# Patient Record
Sex: MALE | Race: WHITE | NOT HISPANIC OR LATINO | Employment: OTHER | URBAN - NONMETROPOLITAN AREA
[De-identification: names, ages, dates, MRNs, and addresses within clinical notes are randomized per-mention and may not be internally consistent; named-entity substitution may affect disease eponyms.]

---

## 2020-01-14 PROCEDURE — 87070 CULTURE OTHR SPECIMN AEROBIC: CPT | Performed by: FAMILY MEDICINE

## 2020-01-14 PROCEDURE — 87205 SMEAR GRAM STAIN: CPT | Performed by: FAMILY MEDICINE

## 2020-01-28 ENCOUNTER — OFFICE VISIT (OUTPATIENT)
Dept: INTERNAL MEDICINE | Facility: CLINIC | Age: 55
End: 2020-01-28

## 2020-01-28 ENCOUNTER — LAB (OUTPATIENT)
Dept: LAB | Facility: HOSPITAL | Age: 55
End: 2020-01-28

## 2020-01-28 VITALS
HEIGHT: 69 IN | WEIGHT: 189 LBS | SYSTOLIC BLOOD PRESSURE: 118 MMHG | TEMPERATURE: 98.2 F | OXYGEN SATURATION: 98 % | DIASTOLIC BLOOD PRESSURE: 72 MMHG | RESPIRATION RATE: 18 BRPM | HEART RATE: 80 BPM | BODY MASS INDEX: 27.99 KG/M2

## 2020-01-28 DIAGNOSIS — Z00.00 HEALTHCARE MAINTENANCE: ICD-10-CM

## 2020-01-28 DIAGNOSIS — Z11.59 ENCOUNTER FOR HEPATITIS C SCREENING TEST FOR LOW RISK PATIENT: ICD-10-CM

## 2020-01-28 DIAGNOSIS — Z12.5 PROSTATE CANCER SCREENING: ICD-10-CM

## 2020-01-28 DIAGNOSIS — I10 ESSENTIAL HYPERTENSION: Primary | ICD-10-CM

## 2020-01-28 DIAGNOSIS — Z12.11 COLON CANCER SCREENING: ICD-10-CM

## 2020-01-28 LAB
ALBUMIN SERPL-MCNC: 4 G/DL (ref 3.5–5.2)
ALBUMIN/GLOB SERPL: 1.5 G/DL
ALP SERPL-CCNC: 130 U/L (ref 39–117)
ALT SERPL W P-5'-P-CCNC: 41 U/L (ref 1–41)
ANION GAP SERPL CALCULATED.3IONS-SCNC: 13.6 MMOL/L (ref 5–15)
AST SERPL-CCNC: 16 U/L (ref 1–40)
BASOPHILS # BLD AUTO: 0.03 10*3/MM3 (ref 0–0.2)
BASOPHILS NFR BLD AUTO: 0.4 % (ref 0–1.5)
BILIRUB SERPL-MCNC: 0.3 MG/DL (ref 0.2–1.2)
BUN BLD-MCNC: 15 MG/DL (ref 6–20)
BUN/CREAT SERPL: 15.3 (ref 7–25)
CALCIUM SPEC-SCNC: 9 MG/DL (ref 8.6–10.5)
CHLORIDE SERPL-SCNC: 101 MMOL/L (ref 98–107)
CHOLEST SERPL-MCNC: 189 MG/DL (ref 0–200)
CO2 SERPL-SCNC: 26.4 MMOL/L (ref 22–29)
CREAT BLD-MCNC: 0.98 MG/DL (ref 0.76–1.27)
DEPRECATED RDW RBC AUTO: 39.8 FL (ref 37–54)
EOSINOPHIL # BLD AUTO: 0.24 10*3/MM3 (ref 0–0.4)
EOSINOPHIL NFR BLD AUTO: 3.2 % (ref 0.3–6.2)
ERYTHROCYTE [DISTWIDTH] IN BLOOD BY AUTOMATED COUNT: 12.1 % (ref 12.3–15.4)
GFR SERPL CREATININE-BSD FRML MDRD: 80 ML/MIN/1.73
GLOBULIN UR ELPH-MCNC: 2.6 GM/DL
GLUCOSE BLD-MCNC: 89 MG/DL (ref 65–99)
HCT VFR BLD AUTO: 41.9 % (ref 37.5–51)
HCV AB SER DONR QL: NORMAL
HDLC SERPL-MCNC: 34 MG/DL (ref 40–60)
HGB BLD-MCNC: 14.7 G/DL (ref 13–17.7)
IMM GRANULOCYTES # BLD AUTO: 0.02 10*3/MM3 (ref 0–0.05)
IMM GRANULOCYTES NFR BLD AUTO: 0.3 % (ref 0–0.5)
LDLC SERPL CALC-MCNC: 92 MG/DL (ref 0–100)
LDLC/HDLC SERPL: 2.72 {RATIO}
LYMPHOCYTES # BLD AUTO: 2.13 10*3/MM3 (ref 0.7–3.1)
LYMPHOCYTES NFR BLD AUTO: 28.4 % (ref 19.6–45.3)
MCH RBC QN AUTO: 32 PG (ref 26.6–33)
MCHC RBC AUTO-ENTMCNC: 35.1 G/DL (ref 31.5–35.7)
MCV RBC AUTO: 91.3 FL (ref 79–97)
MONOCYTES # BLD AUTO: 0.56 10*3/MM3 (ref 0.1–0.9)
MONOCYTES NFR BLD AUTO: 7.5 % (ref 5–12)
NEUTROPHILS # BLD AUTO: 4.52 10*3/MM3 (ref 1.7–7)
NEUTROPHILS NFR BLD AUTO: 60.2 % (ref 42.7–76)
NRBC BLD AUTO-RTO: 0 /100 WBC (ref 0–0.2)
PLATELET # BLD AUTO: 307 10*3/MM3 (ref 140–450)
PMV BLD AUTO: 9.3 FL (ref 6–12)
POTASSIUM BLD-SCNC: 4.3 MMOL/L (ref 3.5–5.2)
PROT SERPL-MCNC: 6.6 G/DL (ref 6–8.5)
PSA SERPL-MCNC: 0.46 NG/ML (ref 0–4)
RBC # BLD AUTO: 4.59 10*6/MM3 (ref 4.14–5.8)
SODIUM BLD-SCNC: 141 MMOL/L (ref 136–145)
TRIGL SERPL-MCNC: 313 MG/DL (ref 0–150)
VLDLC SERPL-MCNC: 62.6 MG/DL (ref 5–40)
WBC NRBC COR # BLD: 7.5 10*3/MM3 (ref 3.4–10.8)

## 2020-01-28 PROCEDURE — 80053 COMPREHEN METABOLIC PANEL: CPT | Performed by: INTERNAL MEDICINE

## 2020-01-28 PROCEDURE — 36415 COLL VENOUS BLD VENIPUNCTURE: CPT

## 2020-01-28 PROCEDURE — 85025 COMPLETE CBC W/AUTO DIFF WBC: CPT | Performed by: INTERNAL MEDICINE

## 2020-01-28 PROCEDURE — 80061 LIPID PANEL: CPT | Performed by: INTERNAL MEDICINE

## 2020-01-28 PROCEDURE — 86803 HEPATITIS C AB TEST: CPT | Performed by: INTERNAL MEDICINE

## 2020-01-28 PROCEDURE — G0103 PSA SCREENING: HCPCS | Performed by: INTERNAL MEDICINE

## 2020-01-28 PROCEDURE — 99386 PREV VISIT NEW AGE 40-64: CPT | Performed by: INTERNAL MEDICINE

## 2020-01-28 NOTE — PROGRESS NOTES
Chief Complaint   Patient presents with   • Establish Care   • Hypertension         History:  Rohan Malagon is a 54 y.o. male who presents today for evaluation of the above problems.      He is a 54-year-old gentleman who recently went to urgent care January 14, 2020 for a knot in his right armpit for 2 weeks.  He was diagnosed with a right axillary abscess and was given 10-day course of Bactrim.  He was also hypertensive at 149/90.  He was referred here for his primary care.    Knot present for years, never gotten infected before.  After the antibiotics it is back to its baseline    BP better today.  Has not checked his blood pressure at home.    Has been many years since he has seen a primary care doctor, does not believe he has ever received 1.    Never had colonoscopy.  Family history of lung cancer and he continues to smoke.  He is not ready to quit smoking.    He works as a  and has noticed some decreased muscle mass and some weakness compared to his usual.  HPI    ROS:  Review of Systems   Constitutional: Negative for activity change, appetite change, fatigue, fever and unexpected weight change.   HENT: Negative for nosebleeds, sore throat and trouble swallowing.    Eyes: Negative for pain and visual disturbance.   Respiratory: Positive for wheezing. Negative for cough, chest tightness and shortness of breath.    Cardiovascular: Negative for chest pain, palpitations and leg swelling.   Gastrointestinal: Negative for abdominal pain, constipation, diarrhea, nausea and vomiting.   Endocrine: Negative for cold intolerance and heat intolerance.   Genitourinary: Positive for frequency. Negative for hematuria.   Musculoskeletal: Positive for myalgias. Negative for back pain, neck pain and neck stiffness.   Skin: Negative for rash and wound.   Neurological: Negative for dizziness, syncope, weakness, light-headedness and headaches.   Hematological: Negative for adenopathy. Bruises/bleeds easily.  "  Psychiatric/Behavioral: Negative for agitation, behavioral problems and confusion.       No Known Allergies  History reviewed. No pertinent past medical history.  History reviewed. No pertinent surgical history.  Family History   Problem Relation Age of Onset   • Arthritis Mother    • Cancer Father      Rohan  reports that he has been smoking cigars. He has never used smokeless tobacco. He reports that he drinks alcohol. Drug use questions deferred to the physician.    I have reviewed and updated the above documentation (if necessary) including but not limited to chief complaint, ROS, PFSH, allergies and medications      No current outpatient medications on file.    PHQ-9 Depression Screening  Little interest or pleasure in doing things? 0   Feeling down, depressed, or hopeless? 0   Trouble falling or staying asleep, or sleeping too much?     Feeling tired or having little energy?     Poor appetite or overeating?     Feeling bad about yourself - or that you are a failure or have let yourself or your family down?     Trouble concentrating on things, such as reading the newspaper or watching television?     Moving or speaking so slowly that other people could have noticed? Or the opposite - being so fidgety or restless that you have been moving around a lot more than usual?     Thoughts that you would be better off dead, or of hurting yourself in some way?     PHQ-9 Total Score 0   If you checked off any problems, how difficult have these problems made it for you to do your work, take care of things at home, or get along with other people?       PHQ-9 Total Score: 0    OBJECTIVE:  Visit Vitals  /72 (BP Location: Left arm, Patient Position: Sitting)   Pulse 80   Temp 98.2 °F (36.8 °C) (Oral)   Resp 18   Ht 175.3 cm (69\")   Wt 85.7 kg (189 lb)   SpO2 98%   BMI 27.91 kg/m²      Physical Exam   Constitutional: He is oriented to person, place, and time. He appears well-developed and well-nourished. No distress. "   HENT:   Head: Normocephalic and atraumatic.   Mouth/Throat: Oropharynx is clear and moist. No oropharyngeal exudate.   Clear TMs   Eyes: Pupils are equal, round, and reactive to light. Conjunctivae and EOM are normal. No scleral icterus.   Neck: Normal range of motion. Neck supple. No JVD present. No thyromegaly present.   Cardiovascular: Normal rate, regular rhythm and normal heart sounds. Exam reveals no friction rub.   No murmur heard.  Pulmonary/Chest: Effort normal and breath sounds normal. No stridor. He has no wheezes. He has no rales.   Abdominal: Soft. Bowel sounds are normal. He exhibits no distension. There is no tenderness. There is no rebound and no guarding.   Musculoskeletal: He exhibits no edema or tenderness.   Lymphadenopathy:     He has no cervical adenopathy.   Neurological: He is alert and oriented to person, place, and time. No cranial nerve deficit.   3+ bilateral patellar reflexes   Skin: Skin is warm and dry.   Small 1 x 2 cm right axillary abscess, able to express some purulent material.  No lymphadenopathy   Psychiatric: He has a normal mood and affect. His behavior is normal.       MDM    Assessment/Plan    Rohan was seen today for establish care and hypertension.    Diagnoses and all orders for this visit:    Essential hypertension    Healthcare maintenance  -     Comprehensive Metabolic Panel; Future  -     CBC & Differential; Future  -     Lipid Panel; Future    Encounter for hepatitis C screening test for low risk patient  -     Hepatitis C antibody; Future    Colon cancer screening  -     Ambulatory Referral to Gastroenterology    Prostate cancer screening  -     PSA SCREENING; Future    His blood pressure is actually well controlled today.  Given him some information on DASH diet and preventing hypertension.  Also discussed to check blood pressure at home and notify me if it is 140-150 systolic.  Would like to check labs as above.  Discussed prostate cancer screening and he would  like a PSA today.  I referred him to gastroenterology for colon cancer screening as well.    His right axillary abscess is improving.  Suspect it started as folliculitis but it is improving.  Also discussed if it becomes more bothersome he could see a dermatologist or general surgeon for incision and drainage.  At this point it is not bothersome to him.    He continues to smoke and is not ready to quit.  Next year low dose CT chest for lung cancer screen    Follow-up 1 year for annual physical or sooner if needed    Patient's Body mass index is 27.91 kg/m². BMI is above normal parameters. Recommendations include: educational material.      Education materials and an After Visit Summary were printed and given to the patient at discharge.  Return in about 1 year (around 1/28/2021) for Annual physical.         DMahendra Walker MD   8:20 AM  1/28/2020

## 2020-01-30 ENCOUNTER — TELEPHONE (OUTPATIENT)
Dept: INTERNAL MEDICINE | Facility: CLINIC | Age: 55
End: 2020-01-30

## 2020-01-30 NOTE — TELEPHONE ENCOUNTER
LET PT KNOW THAT HIS LABS WERE NORMAL EXCEPT HIS TRIGLYCERIDES WERE ELEVATED. NEEDED TO WATCH WHAT HE EATS AND EXERCISE.

## 2020-05-12 ENCOUNTER — TELEPHONE (OUTPATIENT)
Dept: GASTROENTEROLOGY | Facility: CLINIC | Age: 55
End: 2020-05-12

## 2020-05-12 NOTE — TELEPHONE ENCOUNTER
Called to reschedule colonoscopy screening consultation.  Patient said he wasn't sure about that right now and would have to call us back.

## 2021-01-29 ENCOUNTER — OFFICE VISIT (OUTPATIENT)
Dept: INTERNAL MEDICINE | Facility: CLINIC | Age: 56
End: 2021-01-29

## 2021-01-29 ENCOUNTER — LAB (OUTPATIENT)
Dept: LAB | Facility: HOSPITAL | Age: 56
End: 2021-01-29

## 2021-01-29 VITALS
HEIGHT: 69 IN | HEART RATE: 79 BPM | OXYGEN SATURATION: 98 % | WEIGHT: 181.2 LBS | DIASTOLIC BLOOD PRESSURE: 84 MMHG | BODY MASS INDEX: 26.84 KG/M2 | TEMPERATURE: 97.6 F | SYSTOLIC BLOOD PRESSURE: 138 MMHG

## 2021-01-29 DIAGNOSIS — Z12.5 PROSTATE CANCER SCREENING: ICD-10-CM

## 2021-01-29 DIAGNOSIS — Z00.00 ENCOUNTER FOR PREVENTIVE CARE: Primary | ICD-10-CM

## 2021-01-29 DIAGNOSIS — Z13.31 DEPRESSION SCREEN: ICD-10-CM

## 2021-01-29 DIAGNOSIS — Z12.11 COLON CANCER SCREENING: ICD-10-CM

## 2021-01-29 DIAGNOSIS — Z00.00 ENCOUNTER FOR PREVENTIVE CARE: ICD-10-CM

## 2021-01-29 DIAGNOSIS — Z13.6 HYPERTENSION SCREEN: ICD-10-CM

## 2021-01-29 LAB
ALBUMIN SERPL-MCNC: 4.5 G/DL (ref 3.5–5.2)
ALBUMIN/GLOB SERPL: 2 G/DL
ALP SERPL-CCNC: 101 U/L (ref 39–117)
ALT SERPL W P-5'-P-CCNC: 17 U/L (ref 1–41)
ANION GAP SERPL CALCULATED.3IONS-SCNC: 7.4 MMOL/L (ref 5–15)
AST SERPL-CCNC: 18 U/L (ref 1–40)
BILIRUB SERPL-MCNC: 0.6 MG/DL (ref 0–1.2)
BUN SERPL-MCNC: 17 MG/DL (ref 6–20)
BUN/CREAT SERPL: 21.5 (ref 7–25)
CALCIUM SPEC-SCNC: 9.6 MG/DL (ref 8.6–10.5)
CHLORIDE SERPL-SCNC: 104 MMOL/L (ref 98–107)
CHOLEST SERPL-MCNC: 184 MG/DL (ref 0–200)
CO2 SERPL-SCNC: 29.6 MMOL/L (ref 22–29)
CREAT SERPL-MCNC: 0.79 MG/DL (ref 0.76–1.27)
GFR SERPL CREATININE-BSD FRML MDRD: 102 ML/MIN/1.73
GLOBULIN UR ELPH-MCNC: 2.2 GM/DL
GLUCOSE SERPL-MCNC: 99 MG/DL (ref 65–99)
HBA1C MFR BLD: 5.5 % (ref 4.8–5.6)
HDLC SERPL-MCNC: 44 MG/DL (ref 40–60)
LDLC SERPL CALC-MCNC: 124 MG/DL (ref 0–100)
LDLC/HDLC SERPL: 2.78 {RATIO}
POTASSIUM SERPL-SCNC: 4.4 MMOL/L (ref 3.5–5.2)
PROT SERPL-MCNC: 6.7 G/DL (ref 6–8.5)
PSA SERPL-MCNC: 0.45 NG/ML (ref 0–4)
SODIUM SERPL-SCNC: 141 MMOL/L (ref 136–145)
TRIGL SERPL-MCNC: 89 MG/DL (ref 0–150)
VLDLC SERPL-MCNC: 16 MG/DL (ref 5–40)

## 2021-01-29 PROCEDURE — 80053 COMPREHEN METABOLIC PANEL: CPT

## 2021-01-29 PROCEDURE — 99396 PREV VISIT EST AGE 40-64: CPT | Performed by: INTERNAL MEDICINE

## 2021-01-29 PROCEDURE — G0103 PSA SCREENING: HCPCS

## 2021-01-29 PROCEDURE — 80061 LIPID PANEL: CPT

## 2021-01-29 PROCEDURE — 36415 COLL VENOUS BLD VENIPUNCTURE: CPT

## 2021-01-29 PROCEDURE — 83036 HEMOGLOBIN GLYCOSYLATED A1C: CPT

## 2021-01-29 NOTE — PROGRESS NOTES
Chief Complaint   Patient presents with   • Annual Exam     Yearly         History:  Rohan Malagon is a 55 y.o. male who presents today for evaluation of the above problems.    He presents today for his annual physical.  No new issues or complaints.    At times he will have nocturia but this has been ongoing for the last 20 years.  He does drink noncaffeinated coffee before bed.  Last PSA was normal.    He has lost weight since last visit, about 8 pounds as he is on a diet.    Prefers not to have a colonoscopy as there is no previous issues.    No new medication allergies, social history, past medical history, surgical history, or family history    HPI    Social History     Socioeconomic History   • Marital status:      Spouse name: Not on file   • Number of children: Not on file   • Years of education: Not on file   • Highest education level: Not on file   Tobacco Use   • Smoking status: Current Every Day Smoker     Types: Cigars   • Smokeless tobacco: Never Used   Substance and Sexual Activity   • Alcohol use: Yes   • Drug use: Defer   • Sexual activity: Defer     PHQ-9 Depression Screening  Little interest or pleasure in doing things? 0   Feeling down, depressed, or hopeless? 0   Trouble falling or staying asleep, or sleeping too much?     Feeling tired or having little energy?     Poor appetite or overeating?     Feeling bad about yourself - or that you are a failure or have let yourself or your family down?     Trouble concentrating on things, such as reading the newspaper or watching television?     Moving or speaking so slowly that other people could have noticed? Or the opposite - being so fidgety or restless that you have been moving around a lot more than usual?     Thoughts that you would be better off dead, or of hurting yourself in some way?     PHQ-9 Total Score 0   If you checked off any problems, how difficult have these problems made it for you to do your work, take care of things at home, or  get along with other people?         ROS:  Review of Systems   Constitutional: Negative for activity change, appetite change, fatigue, fever and unexpected weight change.   HENT: Negative for nosebleeds, sore throat and trouble swallowing.    Eyes: Negative for pain and visual disturbance.   Respiratory: Negative for cough, chest tightness and shortness of breath.    Cardiovascular: Negative for chest pain, palpitations and leg swelling.   Gastrointestinal: Negative for abdominal pain, constipation, diarrhea, nausea and vomiting.   Endocrine: Negative for cold intolerance and heat intolerance.   Genitourinary: Positive for frequency. Negative for hematuria.   Musculoskeletal: Negative.  Negative for back pain, neck pain and neck stiffness.   Skin: Negative for rash and wound.   Neurological: Negative for dizziness, syncope, weakness, light-headedness and headaches.   Hematological: Negative for adenopathy. Does not bruise/bleed easily.   Psychiatric/Behavioral: Negative for agitation, behavioral problems and confusion.       No current outpatient medications on file.    Lab Results   Component Value Date    GLUCOSE 89 01/28/2020    BUN 15 01/28/2020    CREATININE 0.98 01/28/2020    EGFRIFNONA 80 01/28/2020    BCR 15.3 01/28/2020    K 4.3 01/28/2020    CO2 26.4 01/28/2020    CALCIUM 9.0 01/28/2020    ALBUMIN 4.00 01/28/2020    AST 16 01/28/2020    ALT 41 01/28/2020       WBC   Date Value Ref Range Status   01/28/2020 7.50 3.40 - 10.80 10*3/mm3 Final     RBC   Date Value Ref Range Status   01/28/2020 4.59 4.14 - 5.80 10*6/mm3 Final     Hemoglobin   Date Value Ref Range Status   01/28/2020 14.7 13.0 - 17.7 g/dL Final     Hematocrit   Date Value Ref Range Status   01/28/2020 41.9 37.5 - 51.0 % Final     MCV   Date Value Ref Range Status   01/28/2020 91.3 79.0 - 97.0 fL Final     MCH   Date Value Ref Range Status   01/28/2020 32.0 26.6 - 33.0 pg Final     MCHC   Date Value Ref Range Status   01/28/2020 35.1 31.5 - 35.7  "g/dL Final     RDW   Date Value Ref Range Status   01/28/2020 12.1 (L) 12.3 - 15.4 % Final     RDW-SD   Date Value Ref Range Status   01/28/2020 39.8 37.0 - 54.0 fl Final     MPV   Date Value Ref Range Status   01/28/2020 9.3 6.0 - 12.0 fL Final     Platelets   Date Value Ref Range Status   01/28/2020 307 140 - 450 10*3/mm3 Final     Neutrophil %   Date Value Ref Range Status   01/28/2020 60.2 42.7 - 76.0 % Final     Lymphocyte %   Date Value Ref Range Status   01/28/2020 28.4 19.6 - 45.3 % Final     Monocyte %   Date Value Ref Range Status   01/28/2020 7.5 5.0 - 12.0 % Final     Eosinophil %   Date Value Ref Range Status   01/28/2020 3.2 0.3 - 6.2 % Final     Basophil %   Date Value Ref Range Status   01/28/2020 0.4 0.0 - 1.5 % Final     Immature Grans %   Date Value Ref Range Status   01/28/2020 0.3 0.0 - 0.5 % Final     Neutrophils, Absolute   Date Value Ref Range Status   01/28/2020 4.52 1.70 - 7.00 10*3/mm3 Final     Lymphocytes, Absolute   Date Value Ref Range Status   01/28/2020 2.13 0.70 - 3.10 10*3/mm3 Final     Monocytes, Absolute   Date Value Ref Range Status   01/28/2020 0.56 0.10 - 0.90 10*3/mm3 Final     Eosinophils, Absolute   Date Value Ref Range Status   01/28/2020 0.24 0.00 - 0.40 10*3/mm3 Final     Basophils, Absolute   Date Value Ref Range Status   01/28/2020 0.03 0.00 - 0.20 10*3/mm3 Final     Immature Grans, Absolute   Date Value Ref Range Status   01/28/2020 0.02 0.00 - 0.05 10*3/mm3 Final     nRBC   Date Value Ref Range Status   01/28/2020 0.0 0.0 - 0.2 /100 WBC Final         OBJECTIVE:  Visit Vitals  /84 (BP Location: Left arm, Patient Position: Sitting, Cuff Size: Adult)   Pulse 79   Temp 97.6 °F (36.4 °C)   Ht 175.3 cm (69\")   Wt 82.2 kg (181 lb 3.2 oz)   SpO2 98%   BMI 26.76 kg/m²      Physical Exam  Vitals signs reviewed.   Constitutional:       General: He is not in acute distress.     Appearance: Normal appearance. He is well-developed.      Comments: Very pleasant   HENT:      " Head: Normocephalic and atraumatic.   Eyes:      Pupils: Pupils are equal, round, and reactive to light.   Neck:      Thyroid: No thyromegaly.      Trachea: Phonation normal.   Cardiovascular:      Rate and Rhythm: Normal rate and regular rhythm.      Heart sounds: No murmur.   Pulmonary:      Effort: No respiratory distress.      Breath sounds: No stridor. No wheezing, rhonchi or rales.   Abdominal:      General: Abdomen is flat. There is no distension.      Palpations: Abdomen is soft.      Tenderness: There is no abdominal tenderness.   Skin:     General: Skin is warm and dry.      Coloration: Skin is not jaundiced or pale.      Findings: No bruising or erythema.   Neurological:      Mental Status: He is alert. Mental status is at baseline.   Psychiatric:         Behavior: Behavior normal.         Thought Content: Thought content normal.         Judgment: Judgment normal.         Assessment/Plan    Diagnoses and all orders for this visit:    1. Encounter for preventive care (Primary)  -     PSA Screen; Future  -     Lipid Panel; Future  -     Hemoglobin A1c; Future  -     Comprehensive Metabolic Panel; Future  -     Cologuard - Stool, Per Rectum; Future    2. Hypertension screen    3. Depression screen    4. Prostate cancer screening  -     PSA Screen; Future    5. Colon cancer screening  -     Cologuard - Stool, Per Rectum; Future    Other orders  -     Cancel: Ambulatory Referral to Gastroenterology      He is doing very well.  We will check the above labs including a CMP, lipid panel, A1c and a PSA.  He prefers not to have a colonoscopy.  I had a discussion regarding colon cancer screening and Cologuard.  He would like to try Cologuard and is open to colonoscopy if this is positive.    Hypertension screen was negative with a blood pressure of 138/84.  Slightly high but not high enough to require any medication.    Depression screen was negative with a PHQ 2 of 0    He has lost weight by changing his  diet.    Remains very active with his work.    Has had the influenza vaccine already this year and is interested in the COVID-19 vaccine.    Follow-up in 1 year for annual physical or sooner if clinically indicated    Return in about 1 year (around 1/29/2022) for Annual physical.    Patient was given instructions and counseling regarding his/her condition or for health maintenance advice. Please see specific information pulled into the AVS if appropriate.     RADHA Walker MD   08:04 CST  1/29/2021

## 2025-02-26 ENCOUNTER — OFFICE VISIT (OUTPATIENT)
Dept: GASTROENTEROLOGY | Facility: CLINIC | Age: 60
End: 2025-02-26
Payer: COMMERCIAL

## 2025-02-26 VITALS
SYSTOLIC BLOOD PRESSURE: 116 MMHG | HEIGHT: 69 IN | DIASTOLIC BLOOD PRESSURE: 78 MMHG | HEART RATE: 83 BPM | WEIGHT: 194 LBS | BODY MASS INDEX: 28.73 KG/M2 | OXYGEN SATURATION: 97 % | TEMPERATURE: 97.7 F

## 2025-02-26 DIAGNOSIS — Z12.11 ENCOUNTER FOR SCREENING COLONOSCOPY: Primary | ICD-10-CM

## 2025-02-26 DIAGNOSIS — Z80.0 FH: COLON CANCER: ICD-10-CM

## 2025-02-26 RX ORDER — LISINOPRIL 10 MG/1
1 TABLET ORAL DAILY
COMMUNITY

## 2025-02-26 NOTE — PROGRESS NOTES
"Primary Physician: Leonides Manuel APRN    Chief Complaint   Patient presents with    Colon Cancer Screening     Pt presents today for evaluation for screening colonoscopy; Family history of colon cancer       Subjective     Rohan Malagon is a 59 y.o. male.    HPI  Colonoscopy screening  Patient here to set up screening colonoscopy. This will be his first colonoscopy.  He does have a positive family history of colon cancer.    Patient denies any change in bowel habits.  No diarrhea, constipation, abdominal pain, or rectal bleeding.    Family history of colon cancer  Sister had colon cancer around age 60    Past Medical History:   Diagnosis Date    Family history of colon cancer     Hypertension     Mixed hyperlipidemia        History reviewed. No pertinent surgical history.     Current Outpatient Medications:     lisinopril (PRINIVIL,ZESTRIL) 10 MG tablet, Take 1 tablet by mouth Daily., Disp: , Rfl:     No Known Allergies    Social History     Socioeconomic History    Marital status:    Tobacco Use    Smoking status: Every Day     Types: Cigars    Smokeless tobacco: Never   Vaping Use    Vaping status: Never Used   Substance and Sexual Activity    Alcohol use: Yes     Comment: Occasionally on weekends    Drug use: Never    Sexual activity: Defer       Family History   Problem Relation Age of Onset    Arthritis Mother     Pancreatic cancer Father     Lung cancer Father     Colon cancer Sister 61    Colon polyps Neg Hx     Esophageal cancer Neg Hx     Liver cancer Neg Hx     Stomach cancer Neg Hx     Rectal cancer Neg Hx     Liver disease Neg Hx        Review of Systems   Constitutional:  Negative for unexpected weight change.   Respiratory:  Negative for shortness of breath.    Cardiovascular:  Negative for chest pain.       Objective     /78 (BP Location: Left arm, Patient Position: Sitting, Cuff Size: Adult)   Pulse 83   Temp 97.7 °F (36.5 °C) (Infrared)   Ht 175.3 cm (69\")   Wt 88 kg (194 lb)  "  SpO2 97%   BMI 28.65 kg/m²     Physical Exam  Vitals reviewed.   Constitutional:       Appearance: Normal appearance.   Cardiovascular:      Rate and Rhythm: Normal rate and regular rhythm.      Heart sounds: Normal heart sounds.   Pulmonary:      Effort: Pulmonary effort is normal.      Breath sounds: Normal breath sounds.   Neurological:      Mental Status: He is alert.             IMPRESSION/PLAN:    Assessment & Plan      Problem List Items Addressed This Visit       Encounter for screening colonoscopy - Primary    Relevant Orders    Case Request (Completed)    Follow Anesthesia Guidelines / Protocol    FH: colon cancer    Overview     Sister had colon cancer          Colonoscopy per Dr. Clemons  Miralax Prep          ..The risks, benefits, and alternatives of colonoscopy were reviewed with the patient today.  Risks including perforation of the colon possibly requiring surgery or colostomy.  Additional risks include risk of bleeding from biopsies or removal of colon tissue.  There is also the risk of a drug reaction or problems with anesthesia.  This will be discussed with the further by the anesthesia team on the day of the procedure.  Lastly there is a possibility of missing a colon polyp or cancer.  The benefits include the diagnosis and management of disease of the colon and rectum.  Alternatives to colonoscopy include barium enema, laboratory testing, radiographic evaluation, or no intervention.  The patient verbalizes understanding and agrees.    In accordance with requirements under the Affordable Care Act, [facility] has provided pricing for all hospital services and items on each of its websites. However, a patient's actual cost may differ based on the services the patient receives to meet individual healthcare needs and based on the benefits provided under the patient’s insurance coverage.        Sondra Somers, APRN  02/26/25  09:27 CST    Part of this note may be an electronic  transcription/translation of spoken language to printed text.

## 2025-04-16 ENCOUNTER — HOSPITAL ENCOUNTER (OUTPATIENT)
Facility: HOSPITAL | Age: 60
Setting detail: HOSPITAL OUTPATIENT SURGERY
Discharge: HOME | End: 2025-04-16
Attending: INTERNAL MEDICINE | Admitting: INTERNAL MEDICINE
Payer: COMMERCIAL

## 2025-04-16 ENCOUNTER — ANESTHESIA (OUTPATIENT)
Dept: GASTROENTEROLOGY | Facility: HOSPITAL | Age: 60
End: 2025-04-16
Payer: COMMERCIAL

## 2025-04-16 ENCOUNTER — ANESTHESIA EVENT (OUTPATIENT)
Dept: GASTROENTEROLOGY | Facility: HOSPITAL | Age: 60
End: 2025-04-16
Payer: COMMERCIAL

## 2025-04-16 VITALS
HEART RATE: 73 BPM | RESPIRATION RATE: 20 BRPM | TEMPERATURE: 97.6 F | WEIGHT: 181 LBS | SYSTOLIC BLOOD PRESSURE: 127 MMHG | BODY MASS INDEX: 26.81 KG/M2 | OXYGEN SATURATION: 100 % | DIASTOLIC BLOOD PRESSURE: 85 MMHG | HEIGHT: 69 IN

## 2025-04-16 DIAGNOSIS — Z12.11 ENCOUNTER FOR SCREENING COLONOSCOPY: ICD-10-CM

## 2025-04-16 PROCEDURE — 88305 TISSUE EXAM BY PATHOLOGIST: CPT | Performed by: INTERNAL MEDICINE

## 2025-04-16 PROCEDURE — 25810000003 SODIUM CHLORIDE 0.9 % SOLUTION

## 2025-04-16 PROCEDURE — 25010000002 PROPOFOL 10 MG/ML EMULSION

## 2025-04-16 PROCEDURE — 25010000002 LIDOCAINE PF 2% 2 % SOLUTION

## 2025-04-16 PROCEDURE — 45385 COLONOSCOPY W/LESION REMOVAL: CPT | Performed by: INTERNAL MEDICINE

## 2025-04-16 PROCEDURE — 25810000003 SODIUM CHLORIDE 0.9 % SOLUTION: Performed by: ANESTHESIOLOGY

## 2025-04-16 RX ORDER — PROPOFOL 10 MG/ML
VIAL (ML) INTRAVENOUS AS NEEDED
Status: DISCONTINUED | OUTPATIENT
Start: 2025-04-16 | End: 2025-04-16 | Stop reason: SURG

## 2025-04-16 RX ORDER — LIDOCAINE HYDROCHLORIDE 20 MG/ML
INJECTION, SOLUTION EPIDURAL; INFILTRATION; INTRACAUDAL; PERINEURAL AS NEEDED
Status: DISCONTINUED | OUTPATIENT
Start: 2025-04-16 | End: 2025-04-16 | Stop reason: SURG

## 2025-04-16 RX ORDER — SODIUM CHLORIDE 0.9 % (FLUSH) 0.9 %
10 SYRINGE (ML) INJECTION AS NEEDED
Status: DISCONTINUED | OUTPATIENT
Start: 2025-04-16 | End: 2025-04-16 | Stop reason: HOSPADM

## 2025-04-16 RX ORDER — LIDOCAINE HYDROCHLORIDE 10 MG/ML
0.5 INJECTION, SOLUTION EPIDURAL; INFILTRATION; INTRACAUDAL; PERINEURAL ONCE AS NEEDED
Status: DISCONTINUED | OUTPATIENT
Start: 2025-04-16 | End: 2025-04-16 | Stop reason: HOSPADM

## 2025-04-16 RX ORDER — SODIUM CHLORIDE 9 MG/ML
500 INJECTION, SOLUTION INTRAVENOUS ONCE
Status: COMPLETED | OUTPATIENT
Start: 2025-04-16 | End: 2025-04-16

## 2025-04-16 RX ORDER — SODIUM CHLORIDE 9 MG/ML
INJECTION, SOLUTION INTRAVENOUS CONTINUOUS PRN
Status: DISCONTINUED | OUTPATIENT
Start: 2025-04-16 | End: 2025-04-16 | Stop reason: SURG

## 2025-04-16 RX ADMIN — LIDOCAINE HYDROCHLORIDE 100 MG: 20 INJECTION, SOLUTION EPIDURAL; INFILTRATION; INTRACAUDAL; PERINEURAL at 10:42

## 2025-04-16 RX ADMIN — SODIUM CHLORIDE: 9 INJECTION, SOLUTION INTRAVENOUS at 10:38

## 2025-04-16 RX ADMIN — PROPOFOL 350 MG: 10 INJECTION, EMULSION INTRAVENOUS at 10:42

## 2025-04-16 RX ADMIN — SODIUM CHLORIDE 500 ML: 9 INJECTION, SOLUTION INTRAVENOUS at 10:27

## 2025-04-16 NOTE — H&P
"    Chief Complaint:   Colon cancer screening    Subjective     HPI:   Patient presents for for screening colonoscopy.  Sister had colon cancer at age 61.    Past Medical History:   Past Medical History:   Diagnosis Date    Family history of colon cancer     Hypertension     Mixed hyperlipidemia        Past Surgical History:  Past Surgical History:   Procedure Laterality Date    DENTAL PROCEDURE          Family History:  Family History   Problem Relation Age of Onset    Arthritis Mother     Pancreatic cancer Father     Lung cancer Father     Colon cancer Sister 61    Colon polyps Neg Hx     Esophageal cancer Neg Hx     Liver cancer Neg Hx     Stomach cancer Neg Hx     Rectal cancer Neg Hx     Liver disease Neg Hx        Social History:   reports that he has been smoking cigars. He has never used smokeless tobacco. He reports current alcohol use. He reports that he does not use drugs.    Medications:   Medications Prior to Admission   Medication Sig Dispense Refill Last Dose/Taking    lisinopril (PRINIVIL,ZESTRIL) 10 MG tablet Take 1 tablet by mouth Daily.   4/15/2025 Morning       Allergies:  Patient has no known allergies.    ROS:    Resp: No SOA  Cardiovascular: No CP      Objective     /80 (Patient Position: Sitting)   Pulse 83   Temp 97.6 °F (36.4 °C) (Temporal)   Resp 17   Ht 175.3 cm (69\")   Wt 82.1 kg (181 lb)   SpO2 98%   BMI 26.73 kg/m²     Physical Exam   Constitutional: Patient is oriented to person, place, and in no distress.  Pulmonary/Chest: No distress.  No audible wheezes  Psychiatric: Mood, memory, affect and judgment appear normal.     Assessment & Plan     Diagnosis:  Colon cancer screening  Family history of colon cancer in first-degree relative    Anticipated Surgical Procedure:  Colonoscopy    The risks, benefits, and alternatives of colonoscopy were reviewed with the patient today.  Risks including perforation of the colon possibly requiring surgery or colostomy.  Additional risks " include risk of bleeding from biopsies or removal of colon tissue.  There is also the risk of a drug reaction or problems with anesthesia.  This will be discussed with the patient further by the anesthesia team on the day of the procedure.  Lastly there is a possibility of missing a colon polyp or cancer.  The benefits include the diagnosis and management of disease of the colon and rectum.  Alternatives to colonoscopy include barium enema, laboratory testing, radiographic evaluation, or no intervention.  The patient verbalizes understanding and agrees.        Please note that portions of this note were completed with a voice recognition program.

## 2025-04-16 NOTE — ANESTHESIA PREPROCEDURE EVALUATION
Anesthesia Evaluation     Patient summary reviewed   no history of anesthetic complications:   NPO Solid Status: > 8 hours             Airway   Mallampati: I  TM distance: >3 FB  Dental      Comment: Upper and lower bridge    Pulmonary    (+) a smoker,  (-) asthma, sleep apnea  Cardiovascular   Exercise tolerance: good (4-7 METS)    (+) hypertension  (-) past MI, dysrhythmias, cardiac stents      Neuro/Psych  (-) seizures, TIA, CVA  GI/Hepatic/Renal/Endo    (-) liver disease, no renal disease, diabetes    Musculoskeletal     Abdominal    Substance History      OB/GYN          Other                    Anesthesia Plan    ASA 2     MAC       Anesthetic plan, risks, benefits, and alternatives have been provided, discussed and informed consent has been obtained with: patient.    CODE STATUS:

## 2025-04-16 NOTE — ANESTHESIA POSTPROCEDURE EVALUATION
"Patient: Rohan Malagon    Procedure Summary       Date: 04/16/25 Room / Location:  [facility] /  [facility]    Anesthesia Start: 1039 Anesthesia Stop: 1106    Procedure: COLONOSCOPY WITH ANESTHESIA Diagnosis:       Encounter for screening colonoscopy      (Encounter for screening colonoscopy [Z12.11])    Surgeons: Sravanthi Clemons MD Provider: Del Jara CRNA    Anesthesia Type: MAC ASA Status: 2            Anesthesia Type: MAC    Vitals  Vitals Value Taken Time   /85 04/16/25 11:21   Temp     Pulse 73 04/16/25 11:23   Resp 16 04/16/25 11:20   SpO2 99 % 04/16/25 11:23   Vitals shown include unfiled device data.        Post Anesthesia Care and Evaluation    Patient location during evaluation: PHASE II  Patient participation: complete - patient participated  Level of consciousness: awake  Pain management: adequate    Airway patency: patent  Anesthetic complications: No anesthetic complications    Cardiovascular status: acceptable  Respiratory status: acceptable  Hydration status: acceptable    Comments: /85   Pulse 73   Temp 97.6 °F (36.4 °C) (Temporal)   Resp 16   Ht 175.3 cm (69\")   Wt 82.1 kg (181 lb)   SpO2 99%   BMI 26.73 kg/m²       "

## 2025-04-18 LAB
CYTO UR: NORMAL
LAB AP CASE REPORT: NORMAL
Lab: NORMAL
PATH REPORT.FINAL DX SPEC: NORMAL
PATH REPORT.GROSS SPEC: NORMAL

## 2025-04-21 PROBLEM — Z86.0101 PERSONAL HISTORY OF ADENOMATOUS AND SERRATED COLON POLYPS: Status: ACTIVE | Noted: 2025-02-26

## 2025-06-13 ENCOUNTER — TRANSCRIBE ORDERS (OUTPATIENT)
Dept: ADMINISTRATIVE | Facility: HOSPITAL | Age: 60
End: 2025-06-13
Payer: COMMERCIAL

## 2025-06-13 DIAGNOSIS — E78.2 MIXED HYPERLIPIDEMIA: Primary | ICD-10-CM

## 2025-06-13 DIAGNOSIS — Z12.2 SCREENING FOR MALIGNANT NEOPLASM OF RESPIRATORY ORGAN: Primary | ICD-10-CM

## 2025-07-14 ENCOUNTER — TELEPHONE (OUTPATIENT)
Dept: SURGERY | Facility: CLINIC | Age: 60
End: 2025-07-14
Payer: COMMERCIAL

## 2025-07-14 NOTE — TELEPHONE ENCOUNTER
Attempted to contact PT regarding scheduling for their referral. I left them a voicemail with our office number and requested they call us back at their earliest convenience to get scheduled.     [facility]  7/14/2025

## 2025-07-15 ENCOUNTER — PATIENT ROUNDING (BHMG ONLY) (OUTPATIENT)
Dept: SURGERY | Facility: CLINIC | Age: 60
End: 2025-07-15
Payer: COMMERCIAL

## 2025-07-15 ENCOUNTER — OFFICE VISIT (OUTPATIENT)
Dept: SURGERY | Facility: CLINIC | Age: 60
End: 2025-07-15
Payer: COMMERCIAL

## 2025-07-15 VITALS
SYSTOLIC BLOOD PRESSURE: 118 MMHG | HEIGHT: 69 IN | WEIGHT: 184.4 LBS | DIASTOLIC BLOOD PRESSURE: 78 MMHG | BODY MASS INDEX: 27.31 KG/M2

## 2025-07-15 DIAGNOSIS — L72.3 INFECTED SEBACEOUS CYST: Primary | ICD-10-CM

## 2025-07-15 DIAGNOSIS — L08.9 INFECTED SEBACEOUS CYST: Primary | ICD-10-CM

## 2025-07-15 RX ORDER — SULFAMETHOXAZOLE AND TRIMETHOPRIM 200; 40 MG/5ML; MG/5ML
SUSPENSION ORAL 2 TIMES DAILY
COMMUNITY

## 2025-07-15 NOTE — LETTER
July 15, 2025     MAX Groves  [address] [facility] [address]    Patient: Rohan Malagon   YOB: 1965   Date of Visit: 7/15/2025     Dear MAX Groves:    I have seen Rohan Malagon in my office today for evaluation of his infected sebaceous cyst.  We incised and drained this in the office today, and packed it with iodoform gauze.  He has been counseled regarding appropriate wound care at home, will follow-up with me in 2 weeks.    Should you have any questions or concerns about his care, please feel free to reach out.  Thank you very much for this referral, and for involving me in Rohan 's care.          Sincerely,        Sudeep Carrera MD        CC: No Recipients    Sudeep Carrera MD  07/15/25 1524  Sign when Signing Visit  Procedure  Incision & Drainage    Date/Time: 7/15/2025 3:23 PM    Performed by: Sudeep Carrera MD  Authorized by: Sudeep Carrera MD  Indications for incision and drainage: Infected sebaceous cyst.  Body area: head/neck  Location details: neck  Anesthesia: local infiltration    Anesthesia:  Local Anesthetic: lidocaine 1% with epinephrine  Anesthetic total: 15 mL    Sedation:  Patient sedated: no    Scalpel size: 11  Needle gauge: 22  Incision type: Cruciate.  Complexity: complex  Drainage: purulent and bloody (Sebum)  Drainage amount: copious  Wound treatment: wound left open  Patient tolerance: patient tolerated the procedure well with no immediate complications             Sudeep Carrera MD  07/15/25 1525  Sign when Signing Visit    [facility] History and Physical  Rohan Malagon  MRN: [id number]  Age: 59 y.o. male     YOB: 1965    Primary   Problem      Infected soft tissue mass left posterior neck    SUBJECTIVE  History  of Presenting Illness     History of Present Illness  The patient presents for a likely infected sebaceous cyst.  First popped up on him several  weeks ago, has gotten worse, inflamed, saw his PCP who referred him to us after starting antibiotics.    He reports no significant change in his condition. He has a history of a similar cyst under his arm, which was drained and treated with antibiotics several years ago. He has no other major medical issues. He is currently on medication for hypertension. He does not take any blood thinners such as aspirin or Plavix, and has no history of heart attack or stroke. His surgical history includes a colonoscopy during which polyps were removed.             Past Medical History     Past Medical History:  Past Medical History:   Diagnosis Date   • Family history of colon cancer    • Hypertension    • Mixed hyperlipidemia        PCP: William Manuel APRN    Past Surgical History:  Past Surgical History:   Procedure Laterality Date   • COLONOSCOPY N/A 4/16/2025    Procedure: COLONOSCOPY WITH ANESTHESIA;  Surgeon: Sravanthi Clemons MD;  Location: [facility] ENDOSCOPY;  Service: Gastroenterology;  Laterality: N/A;  preop; screening   postop polyps   PCP william Manuel   • DENTAL PROCEDURE         Family History:  Family History   Problem Relation Age of Onset   • Arthritis Mother    • Pancreatic cancer Father    • Lung cancer Father    • Colon cancer Sister 61   • Colon polyps Neg Hx    • Esophageal cancer Neg Hx    • Liver cancer Neg Hx    • Stomach cancer Neg Hx    • Rectal cancer Neg Hx    • Liver disease Neg Hx        Social History:  Social History     Tobacco Use   • Smoking status: Every Day     Types: Cigars   • Smokeless tobacco: Never   Substance Use Topics   • Alcohol use: Yes     Comment: Occasionally on weekends       Allergies:  No Known Allergies    Medications:  Current Outpatient Medications   Medication Instructions   • lisinopril (PRINIVIL,ZESTRIL) 10 MG tablet 1 tablet, Daily   • sulfamethoxazole-trimethoprim (BACTRIM,SEPTRA) 200-40 MG/5ML suspension 2 Times Daily           Review of Systems   Per  "HPI.    Physical Examination     Vitals:    07/15/25 0922   BP: 118/78   Weight: 83.6 kg (184 lb 6.4 oz)   Height: 175.3 cm (69.02\")       Estimated body mass index is 27.22 kg/m² as calculated from the following:    Height as of this encounter: 175.3 cm (69.02\").    Weight as of this encounter: 83.6 kg (184 lb 6.4 oz).  PREVIOUS WEIGHTS:   Wt Readings from Last 5 Encounters:   07/15/25 83.6 kg (184 lb 6.4 oz)   04/16/25 82.1 kg (181 lb)   02/26/25 88 kg (194 lb)   05/09/24 86.6 kg (191 lb)   01/29/21 82.2 kg (181 lb 3.2 oz)       Physical Examination:  Gen: awake, alert, sitting up in chair in no acute distress  HEENT: NCAT, EOMI, anicteric sclerae  CV: RRR, normotensive  Resp: nonlabored on room air, sats appropriate  INTEG: Left posterior neck, there is a 3 cm x 3 cm soft tissue mass, which is ruborous, tender to palpation, and has an apparent draining punctum  MSK: moves all four, no c/c/e  Neuro: no focal deficits, cranial nerves grossly intact  Psy:  appropriate, cooperative      Data Review     Labs:  CBC  Lab Results   Component Value Date    WBC 7.50 01/28/2020    HGB 14.7 01/28/2020    HCT 41.9 01/28/2020     01/28/2020      BMP  Lab Results   Component Value Date     01/29/2021    K 4.4 01/29/2021     01/29/2021    CO2 29.6 (H) 01/29/2021    BUN 17 01/29/2021    CREATININE 0.79 01/29/2021    GLUCOSE 99 01/29/2021    CALCIUM 9.6 01/29/2021      LFTs  Lab Results   Component Value Date    PROTEINTOT 6.7 01/29/2021    ALBUMIN 4.50 01/29/2021    BILITOT 0.6 01/29/2021    ALT 17 01/29/2021    AST 18 01/29/2021    ALKPHOS 101 01/29/2021      Pathology:  Lab Results   Component Value Date    NOTE  04/16/2025     This report may contain a detailed description of human tissue sent by a health care provider to the laboratory for pathologic evaluation. The content of this report is essential for diagnosis and may provide important critical findings. This information may be unfamiliar to patients " "to review without a medical professional present. It is advised that the patient review this report in the presence of a health care provider who can answer questions and explain the results.      CASEREPORT  04/16/2025     Surgical Pathology Report                         Case: [id number]                                  Authorizing Provider:  Sravanthi Clemons MD         Collected:           04/16/2025 10:50 AM          Ordering Location:     [facility]     Received:            04/16/2025 02:31 PM                                 ENDOSCOPY                                                                    Pathologist:           Malena Pina MD                                                        Specimens:   1) - Large Intestine, polyp at cecum                                                                2) - Large Intestine, polyp at transverse                                                           3) - Large Intestine, polyp at sigmod                                                               4) - Large Intestine, polyp at rectum x4                                                   FINALDX  04/16/2025     1.  Large intestine, cecum, polypectomy: Hyperplastic polyp.    2.  Large intestine, transverse colon, polypectomy:  - Adenomatous polyp, tubular type.  - No high-grade dysplasia identified.    3.  Large intestine, sigmoid colon, polypectomy: Hyperplastic polyp.    4.  Large intestine, rectal polyps x 4, polypectomy: Fragments of hyperplastic polyps.      GROSSDES  04/16/2025     1. Large Intestine.  Received in a formalin filled container labeled with the patient's name, medical record number and \"polyp at cecum\" is a single fragment of tan-pink mucosa measuring 0.5 cm in greatest dimension.  The specimen is entirely submitted in a cassette labeled 1A.      2. Large Intestine.  Received in a formalin filled container labeled with the patient's name, medical record number and \"polyp at " "transverse\" is a single fragment of tan-pink mucosa measuring 0.1 cm in greatest dimension.  The specimen is entirely submitted in a cassette labeled 2A.      3. Large Intestine.  Received in a formalin filled container labeled with the patient's name, medical record number and \"polyp at sigmoid\" is a single fragment of tan-pink mucosa measuring 0.5 cm in greatest dimension.  The specimen is entirely submitted in a cassette labeled 3A.      4. Large Intestine.  Received in a formalin filled container labeled with the patient's name, medical record number and \"polyp at rectum x 4\" are multiple fragments of tan-pink polypoid mucosa measuring up to 0.5 cm in greatest dimension.  The specimen is entirely submitted in a cassette labeled 4A.          MICRO  04/16/2025     Microscopic examination performed on all specimens.               Problem List     Patient Active Problem List   Diagnosis   • Personal history of adenomatous and serrated colon polyps   • FH: colon cancer   • Infected sebaceous cyst            Assessment/Plan     Assessment & Plan  1. Infected sebaceous cyst.  Symptoms and physical examination findings are consistent with an infected sebaceous cyst. A procedure to drain the cyst was performed today under local anesthesia. The cyst was incised, drained, and packed with iodoform gauze to keep it open and allow for continued drainage. Advised to complete the course of antibiotics previously prescribed. Post-procedure care instructions were provided, including keeping the area clean, replacing the dressing after showering, and using over-the-counter pain relievers like Tylenol or ibuprofen if needed. The cyst will be left open to drain and heal. A follow-up appointment will be scheduled in a couple of weeks to assess healing and discuss further management options, including the potential removal of the cyst wall to prevent recurrence.    Risks, benefits, and alternatives of the procedure were discussed. The " risks include infection, bleeding, and recurrence of the cyst. The benefits include relief from pain and infection. Alternatives include leaving the cyst untreated, which may lead to worsening infection or recurrence. The patient was informed that the worst part of the procedure is usually the local anesthetic, which feels like a bad bee sting, but it will numb the area. No pain medications are typically required post-procedure, but Tylenol or ibuprofen can be used if there is significant tenderness. The patient was also informed that the cyst would be left open and packed with gauze to prevent it from closing up and getting reinfected. The follow-up visit will determine if further surgical intervention is needed to remove the cyst wall completely.          Smoking cessation: Daily smoker-counseled regarding need for cessation  BMI is >= 25 and <30. (Overweight) The following options were offered after discussion;: information on healthy weight added to patient's after visit summary   Med rec complete: yes  VTE: VTE PPX is not indicated.    Time Spent: I spent 30 minutes caring for Rohan Malagon on this date of service. This time includes time, independent of any procedures, spent by me in the following activities: preparing for the clinic visit, reviewing tests, obtaining and/or reviewing a separately obtained history, performing a medically appropriate examination and/or evaluation, counseling and educating the patient/family/caregiver, ordering medications, tests, or procedures, and documenting information in the medical record.     Sudeep Carrera MD  7/15/2025   15:20 CDT    Patient or patient representative verbalized consent for the use of Ambient Listening during the visit with  Sudeep Carrera MD for chart documentation. 7/15/2025  15:22 CDT

## 2025-07-15 NOTE — PROGRESS NOTES
Procedure   Incision & Drainage    Date/Time: 7/15/2025 3:23 PM    Performed by: Sudeep Carrera MD  Authorized by: Sudeep Carrera MD  Indications for incision and drainage: Infected sebaceous cyst.  Body area: head/neck  Location details: neck  Anesthesia: local infiltration    Anesthesia:  Local Anesthetic: lidocaine 1% with epinephrine  Anesthetic total: 15 mL    Sedation:  Patient sedated: no    Scalpel size: 11  Needle gauge: 22  Incision type: Cruciate.  Complexity: complex  Drainage: purulent and bloody (Sebum)  Drainage amount: copious  Wound treatment: wound left open  Patient tolerance: patient tolerated the procedure well with no immediate complications

## 2025-07-15 NOTE — PROGRESS NOTES
[facility] History and Physical  Rohan Malagon  MRN: [id number]  Age: 59 y.o. male     YOB: 1965    Primary   Problem      Infected soft tissue mass left posterior neck    SUBJECTIVE  History  of Presenting Illness     History of Present Illness  The patient presents for a likely infected sebaceous cyst.  First popped up on him several weeks ago, has gotten worse, inflamed, saw his PCP who referred him to us after starting antibiotics.    He reports no significant change in his condition. He has a history of a similar cyst under his arm, which was drained and treated with antibiotics several years ago. He has no other major medical issues. He is currently on medication for hypertension. He does not take any blood thinners such as aspirin or Plavix, and has no history of heart attack or stroke. His surgical history includes a colonoscopy during which polyps were removed.             Past Medical History     Past Medical History:  Past Medical History:   Diagnosis Date    Family history of colon cancer     Hypertension     Mixed hyperlipidemia        PCP: William Manuel APRN    Past Surgical History:  Past Surgical History:   Procedure Laterality Date    COLONOSCOPY N/A 4/16/2025    Procedure: COLONOSCOPY WITH ANESTHESIA;  Surgeon: Sravanthi Clemons MD;  Location: [facility] ENDOSCOPY;  Service: Gastroenterology;  Laterality: N/A;  preop; screening   postop polyps   PCP william Manuel    DENTAL PROCEDURE         Family History:  Family History   Problem Relation Age of Onset    Arthritis Mother     Pancreatic cancer Father     Lung cancer Father     Colon cancer Sister 61    Colon polyps Neg Hx     Esophageal cancer Neg Hx     Liver cancer Neg Hx     Stomach cancer Neg Hx     Rectal cancer Neg Hx     Liver disease Neg Hx        Social History:  Social History     Tobacco Use    Smoking status: Every Day     Types: Cigars    Smokeless tobacco: Never   Substance Use  "Topics    Alcohol use: Yes     Comment: Occasionally on weekends       Allergies:  No Known Allergies    Medications:  Current Outpatient Medications   Medication Instructions    lisinopril (PRINIVIL,ZESTRIL) 10 MG tablet 1 tablet, Daily    sulfamethoxazole-trimethoprim (BACTRIM,SEPTRA) 200-40 MG/5ML suspension 2 Times Daily           Review of Systems   Per HPI.    Physical Examination     Vitals:    07/15/25 0922   BP: 118/78   Weight: 83.6 kg (184 lb 6.4 oz)   Height: 175.3 cm (69.02\")       Estimated body mass index is 27.22 kg/m² as calculated from the following:    Height as of this encounter: 175.3 cm (69.02\").    Weight as of this encounter: 83.6 kg (184 lb 6.4 oz).  PREVIOUS WEIGHTS:   Wt Readings from Last 5 Encounters:   07/15/25 83.6 kg (184 lb 6.4 oz)   04/16/25 82.1 kg (181 lb)   02/26/25 88 kg (194 lb)   05/09/24 86.6 kg (191 lb)   01/29/21 82.2 kg (181 lb 3.2 oz)       Physical Examination:  Gen: awake, alert, sitting up in chair in no acute distress  HEENT: NCAT, EOMI, anicteric sclerae  CV: RRR, normotensive  Resp: nonlabored on room air, sats appropriate  INTEG: Left posterior neck, there is a 3 cm x 3 cm soft tissue mass, which is ruborous, tender to palpation, and has an apparent draining punctum  MSK: moves all four, no c/c/e  Neuro: no focal deficits, cranial nerves grossly intact  Psy:  appropriate, cooperative      Data Review     Labs:  CBC  Lab Results   Component Value Date    WBC 7.50 01/28/2020    HGB 14.7 01/28/2020    HCT 41.9 01/28/2020     01/28/2020      BMP  Lab Results   Component Value Date     01/29/2021    K 4.4 01/29/2021     01/29/2021    CO2 29.6 (H) 01/29/2021    BUN 17 01/29/2021    CREATININE 0.79 01/29/2021    GLUCOSE 99 01/29/2021    CALCIUM 9.6 01/29/2021      LFTs  Lab Results   Component Value Date    PROTEINTOT 6.7 01/29/2021    ALBUMIN 4.50 01/29/2021    BILITOT 0.6 01/29/2021    ALT 17 01/29/2021    AST 18 01/29/2021    ALKPHOS 101 01/29/2021 "      Pathology:  Lab Results   Component Value Date    NOTE  04/16/2025     This report may contain a detailed description of human tissue sent by a health care provider to the laboratory for pathologic evaluation. The content of this report is essential for diagnosis and may provide important critical findings. This information may be unfamiliar to patients to review without a medical professional present. It is advised that the patient review this report in the presence of a health care provider who can answer questions and explain the results.      CASEREPORT  04/16/2025     Surgical Pathology Report                         Case: [id number]                                  Authorizing Provider:  Sravanthi Clemons MD         Collected:           04/16/2025 10:50 AM          Ordering Location:     [facility]     Received:            04/16/2025 02:31 PM                                 ENDOSCOPY                                                                    Pathologist:           Malena Pina MD                                                        Specimens:   1) - Large Intestine, polyp at cecum                                                                2) - Large Intestine, polyp at transverse                                                           3) - Large Intestine, polyp at sigmod                                                               4) - Large Intestine, polyp at rectum x4                                                   FINALDX  04/16/2025     1.  Large intestine, cecum, polypectomy: Hyperplastic polyp.    2.  Large intestine, transverse colon, polypectomy:  - Adenomatous polyp, tubular type.  - No high-grade dysplasia identified.    3.  Large intestine, sigmoid colon, polypectomy: Hyperplastic polyp.    4.  Large intestine, rectal polyps x 4, polypectomy: Fragments of hyperplastic polyps.      GROSSDES  04/16/2025     1. Large Intestine.  Received in a formalin filled  "container labeled with the patient's name, medical record number and \"polyp at cecum\" is a single fragment of tan-pink mucosa measuring 0.5 cm in greatest dimension.  The specimen is entirely submitted in a cassette labeled 1A.      2. Large Intestine.  Received in a formalin filled container labeled with the patient's name, medical record number and \"polyp at transverse\" is a single fragment of tan-pink mucosa measuring 0.1 cm in greatest dimension.  The specimen is entirely submitted in a cassette labeled 2A.      3. Large Intestine.  Received in a formalin filled container labeled with the patient's name, medical record number and \"polyp at sigmoid\" is a single fragment of tan-pink mucosa measuring 0.5 cm in greatest dimension.  The specimen is entirely submitted in a cassette labeled 3A.      4. Large Intestine.  Received in a formalin filled container labeled with the patient's name, medical record number and \"polyp at rectum x 4\" are multiple fragments of tan-pink polypoid mucosa measuring up to 0.5 cm in greatest dimension.  The specimen is entirely submitted in a cassette labeled 4A.          MICRO  04/16/2025     Microscopic examination performed on all specimens.               Problem List     Patient Active Problem List   Diagnosis    Personal history of adenomatous and serrated colon polyps    FH: colon cancer    Infected sebaceous cyst            Assessment/Plan     Assessment & Plan  1. Infected sebaceous cyst.  Symptoms and physical examination findings are consistent with an infected sebaceous cyst. A procedure to drain the cyst was performed today under local anesthesia. The cyst was incised, drained, and packed with iodoform gauze to keep it open and allow for continued drainage. Advised to complete the course of antibiotics previously prescribed. Post-procedure care instructions were provided, including keeping the area clean, replacing the dressing after showering, and using over-the-counter pain " relievers like Tylenol or ibuprofen if needed. The cyst will be left open to drain and heal. A follow-up appointment will be scheduled in a couple of weeks to assess healing and discuss further management options, including the potential removal of the cyst wall to prevent recurrence.    Risks, benefits, and alternatives of the procedure were discussed. The risks include infection, bleeding, and recurrence of the cyst. The benefits include relief from pain and infection. Alternatives include leaving the cyst untreated, which may lead to worsening infection or recurrence. The patient was informed that the worst part of the procedure is usually the local anesthetic, which feels like a bad bee sting, but it will numb the area. No pain medications are typically required post-procedure, but Tylenol or ibuprofen can be used if there is significant tenderness. The patient was also informed that the cyst would be left open and packed with gauze to prevent it from closing up and getting reinfected. The follow-up visit will determine if further surgical intervention is needed to remove the cyst wall completely.          Smoking cessation: Daily smoker-counseled regarding need for cessation  BMI is >= 25 and <30. (Overweight) The following options were offered after discussion;: information on healthy weight added to patient's after visit summary   Med rec complete: yes  VTE: VTE PPX is not indicated.    Time Spent: I spent 30 minutes caring for Rohan Malagon on this date of service. This time includes time, independent of any procedures, spent by me in the following activities: preparing for the clinic visit, reviewing tests, obtaining and/or reviewing a separately obtained history, performing a medically appropriate examination and/or evaluation, counseling and educating the patient/family/caregiver, ordering medications, tests, or procedures, and documenting information in the medical record.     Sudeep Carrera,  MD  7/15/2025   15:20 CDT    Patient or patient representative verbalized consent for the use of Ambient Listening during the visit with  Sudeep Carrera MD for chart documentation. 7/15/2025  15:22 CDT

## 2025-07-24 ENCOUNTER — HOSPITAL ENCOUNTER (OUTPATIENT)
Dept: CT IMAGING | Facility: HOSPITAL | Age: 60
Discharge: HOME | End: 2025-07-24
Payer: COMMERCIAL

## 2025-07-24 DIAGNOSIS — Z12.2 SCREENING FOR MALIGNANT NEOPLASM OF RESPIRATORY ORGAN: ICD-10-CM

## 2025-07-24 DIAGNOSIS — E78.2 MIXED HYPERLIPIDEMIA: ICD-10-CM

## 2025-07-24 PROCEDURE — 75571 CT HRT W/O DYE W/CA TEST: CPT

## 2025-07-24 PROCEDURE — 71271 CT THORAX LUNG CANCER SCR C-: CPT

## 2025-07-28 ENCOUNTER — TRANSCRIBE ORDERS (OUTPATIENT)
Dept: ADMINISTRATIVE | Facility: HOSPITAL | Age: 60
End: 2025-07-28
Payer: COMMERCIAL

## 2025-07-28 DIAGNOSIS — J43.9 PULMONARY EMPHYSEMA, UNSPECIFIED EMPHYSEMA TYPE: Primary | ICD-10-CM

## 2025-07-29 ENCOUNTER — OFFICE VISIT (OUTPATIENT)
Dept: SURGERY | Facility: CLINIC | Age: 60
End: 2025-07-29
Payer: COMMERCIAL

## 2025-07-29 VITALS
HEIGHT: 69 IN | DIASTOLIC BLOOD PRESSURE: 72 MMHG | WEIGHT: 185 LBS | BODY MASS INDEX: 27.4 KG/M2 | SYSTOLIC BLOOD PRESSURE: 118 MMHG

## 2025-07-29 DIAGNOSIS — L08.9 INFECTED SEBACEOUS CYST: Primary | ICD-10-CM

## 2025-07-29 DIAGNOSIS — L72.3 INFECTED SEBACEOUS CYST: Primary | ICD-10-CM

## 2025-07-29 PROCEDURE — 99024 POSTOP FOLLOW-UP VISIT: CPT | Performed by: STUDENT IN AN ORGANIZED HEALTH CARE EDUCATION/TRAINING PROGRAM

## 2025-07-29 NOTE — LETTER
"July 30, 2025     MAX Groves  [address] [facility] [id number]    Patient: Rohan Malagon   YOB: 1965   Date of Visit: 7/29/2025     Dear MAX Groves:       I have seen Rohan Malagon in my office for follow-up after his recent incision and drainage of a left neck abscess.  I believe this most likely an infected sebaceous cyst, and I offered the patient formal cyst excision, in order to lower his risk of recurrence, but he has declined at this time.  I counseled him essentially regarding the risk of recurrence, and the need to present for potential incision and drainage should this become infected again, and he is understanding.    Should you have any questions or concerns about his care, please feel free to reach out.  It has been a privilege following Rohan along with you.    Sincerely,        Sudeep Carrera MD        CC: Sudeep Gardiner MD  07/30/25 1719  Sign when Signing Visit    [facility] Progress Note  Rohan Malagon  MRN: [id number]  Age: 59 y.o. male   YOB: 1965      SUBJECTIVE  History of Presenting Illness   Patient is a 59 y.o. male who returns to clinic status post incision and drainage of an abscess on his left neck, likely an infected sebaceous cyst.  Patient reports that he is doing well since his prior visit, drainage is decreasing, and they are unable to pack the wound with a significant amount of iodoform now.  Pain has stopped, he is doing well overall, has no acute complaints or concerning symptoms to report at the time of this visit.      Physical Examination     Vitals:    07/29/25 0922   BP: 118/72   BP Location: Left arm   Patient Position: Sitting   Cuff Size: Adult   Weight: 83.9 kg (185 lb)   Height: 175.3 cm (69\")       Estimated body mass index is 27.32 kg/m² as calculated from the following:    Height as of this encounter: 175.3 cm (69\").    Weight as of this " encounter: 83.9 kg (185 lb).  PREVIOUS WEIGHTS:   Wt Readings from Last 5 Encounters:   07/29/25 83.9 kg (185 lb)   07/15/25 83.6 kg (184 lb 6.4 oz)   04/16/25 82.1 kg (181 lb)   02/26/25 88 kg (194 lb)   05/09/24 86.6 kg (191 lb)       Physical Examination:  Gen: awake, alert, sitting up in chair in no acute distress  HEENT: NCAT, EOMI, anicteric sclerae  CV: RRR, normotensive  Resp: nonlabored on room air, sats appropriate  INTEG: At the left neck, near the base, there is a well-healing cruciate incision from the I&D performed a few weeks ago in my clinic.  I am unable to detect any ongoing abscess, any evidence of active infection.  MSK: moves all four, no c/c/e  Neuro: no focal deficits, cranial nerves grossly intact  Psy:  appropriate, cooperative        Assessment/Plan   Rohan Malagon is a 59 y.o. male with left neck abscess, likely an infected sebaceous cyst, now status post I&D in my office a few weeks ago.  He is doing well, is recovering appropriately, and has no need to continue packing, though with a small opening that remains, he may need to keep a dressing on this for several days yet.  I offered the patient formal cyst excision, likely in the operating room, given the depth of the lesion, and he declines at this time.  He would like to see if he can continue on without getting reinfected, or reoccurring, and if not, he will be present to my office for evaluation and treatment.  I discussed the risk of recurrence, warning signs of impending infection, the patient is understanding and would like to proceed.      Smoking cessation: Daily smoker  BMI: 27.3 - previously addressed.   Med rec complete: yes  VTE: VTE PPX is not indicated.        Sudeep Carrera MD  7/30/2025   17:16 CDT

## 2025-07-30 NOTE — PROGRESS NOTES
"  [facility] General Surgery Clinic Progress Note  Rohan Malagon  MRN: [id number]  Age: 59 y.o. male   YOB: 1965      SUBJECTIVE  History of Presenting Illness   Patient is a 59 y.o. male who returns to clinic status post incision and drainage of an abscess on his left neck, likely an infected sebaceous cyst.  Patient reports that he is doing well since his prior visit, drainage is decreasing, and they are unable to pack the wound with a significant amount of iodoform now.  Pain has stopped, he is doing well overall, has no acute complaints or concerning symptoms to report at the time of this visit.      Physical Examination     Vitals:    07/29/25 0922   BP: 118/72   BP Location: Left arm   Patient Position: Sitting   Cuff Size: Adult   Weight: 83.9 kg (185 lb)   Height: 175.3 cm (69\")       Estimated body mass index is 27.32 kg/m² as calculated from the following:    Height as of this encounter: 175.3 cm (69\").    Weight as of this encounter: 83.9 kg (185 lb).  PREVIOUS WEIGHTS:   Wt Readings from Last 5 Encounters:   07/29/25 83.9 kg (185 lb)   07/15/25 83.6 kg (184 lb 6.4 oz)   04/16/25 82.1 kg (181 lb)   02/26/25 88 kg (194 lb)   05/09/24 86.6 kg (191 lb)       Physical Examination:  Gen: awake, alert, sitting up in chair in no acute distress  HEENT: NCAT, EOMI, anicteric sclerae  CV: RRR, normotensive  Resp: nonlabored on room air, sats appropriate  INTEG: At the left neck, near the base, there is a well-healing cruciate incision from the I&D performed a few weeks ago in my clinic.  I am unable to detect any ongoing abscess, any evidence of active infection.  MSK: moves all four, no c/c/e  Neuro: no focal deficits, cranial nerves grossly intact  Psy:  appropriate, cooperative        Assessment/Plan   Rohan Malagon is a 59 y.o. male with left neck abscess, likely an infected sebaceous cyst, now status post I&D in my office a few weeks ago.  He is doing well, is recovering " appropriately, and has no need to continue packing, though with a small opening that remains, he may need to keep a dressing on this for several days yet.  I offered the patient formal cyst excision, likely in the operating room, given the depth of the lesion, and he declines at this time.  He would like to see if he can continue on without getting reinfected, or reoccurring, and if not, he will be present to my office for evaluation and treatment.  I discussed the risk of recurrence, warning signs of impending infection, the patient is understanding and would like to proceed.      Smoking cessation: Daily smoker  BMI: 27.3 - previously addressed.   Med rec complete: yes  VTE: VTE PPX is not indicated.        Sudeep Carrera MD  7/30/2025   17:16 CDT

## 2025-08-12 ENCOUNTER — HOSPITAL ENCOUNTER (OUTPATIENT)
Dept: PULMONOLOGY | Facility: HOSPITAL | Age: 60
Discharge: HOME | End: 2025-08-12
Payer: COMMERCIAL

## 2025-08-12 DIAGNOSIS — J43.9 PULMONARY EMPHYSEMA, UNSPECIFIED EMPHYSEMA TYPE: ICD-10-CM

## 2025-08-12 PROCEDURE — 94726 PLETHYSMOGRAPHY LUNG VOLUMES: CPT

## 2025-08-12 PROCEDURE — 94060 EVALUATION OF WHEEZING: CPT

## 2025-08-12 PROCEDURE — 94729 DIFFUSING CAPACITY: CPT

## 2025-08-12 RX ORDER — ALBUTEROL SULFATE 0.83 MG/ML
2.5 SOLUTION RESPIRATORY (INHALATION) ONCE
Status: COMPLETED | OUTPATIENT
Start: 2025-08-12 | End: 2025-08-12

## 2025-08-12 RX ADMIN — ALBUTEROL SULFATE 2.5 MG: 2.5 SOLUTION RESPIRATORY (INHALATION) at 08:49

## (undated) DEVICE — ARGYLE YANKAUER BULB TIP WITH VENT: Brand: ARGYLE

## (undated) DEVICE — CUFF,BP,DISP,1 TUBE,ADULT,HP: Brand: MEDLINE

## (undated) DEVICE — MASK,OXYGEN,MED CONC,ADLT,7' TUB, UC: Brand: PENDING

## (undated) DEVICE — DEFENDO AIR WATER SUCTION AND BIOPSY VALVE KIT FOR  OLYMPUS: Brand: DEFENDO AIR/WATER/SUCTION AND BIOPSY VALVE

## (undated) DEVICE — SENSR O2 OXIMAX FNGR A/ 18IN NONSTR

## (undated) DEVICE — THE CHANNEL CLEANING BRUSH IS A NYLON FLEXI BRUSH ATTACHED TO A FLEXIBLE PLASTIC SHEATH DESIGNED TO SAFELY REMOVE DEBRIS FROM FLEXIBLE ENDOSCOPES.

## (undated) DEVICE — THE SINGLE USE ETRAP – POLYP TRAP IS USED FOR SUCTION RETRIEVAL OF ENDOSCOPICALLY REMOVED POLYPS.: Brand: ETRAP

## (undated) DEVICE — Device: Brand: SINGLE USE ELECTROSURGICAL SNARE SD-400